# Patient Record
Sex: FEMALE | URBAN - METROPOLITAN AREA
[De-identification: names, ages, dates, MRNs, and addresses within clinical notes are randomized per-mention and may not be internally consistent; named-entity substitution may affect disease eponyms.]

---

## 2023-03-20 ENCOUNTER — APPOINTMENT (RX ONLY)
Dept: URBAN - METROPOLITAN AREA CLINIC 114 | Facility: CLINIC | Age: 73
Setting detail: DERMATOLOGY
End: 2023-03-20

## 2023-03-20 DIAGNOSIS — L72.8 OTHER FOLLICULAR CYSTS OF THE SKIN AND SUBCUTANEOUS TISSUE: ICD-10-CM

## 2023-03-20 DIAGNOSIS — N76.4 ABSCESS OF VULVA: ICD-10-CM

## 2023-03-20 PROCEDURE — ? INTRALESIONAL KENALOG

## 2023-03-20 PROCEDURE — 99204 OFFICE O/P NEW MOD 45 MIN: CPT | Mod: 25

## 2023-03-20 PROCEDURE — ? PRESCRIPTION

## 2023-03-20 PROCEDURE — ? COUNSELING

## 2023-03-20 PROCEDURE — 11900 INJECT SKIN LESIONS </W 7: CPT

## 2023-03-20 PROCEDURE — ? PRESCRIPTION MEDICATION MANAGEMENT

## 2023-03-20 RX ORDER — DOXYCYCLINE HYCLATE 100 MG/1
TABLET, COATED ORAL
Qty: 28 | Refills: 0 | Status: ERX | COMMUNITY
Start: 2023-03-20

## 2023-03-20 RX ORDER — CLINDAMYCIN PHOSPHATE 10 MG/G
GEL TOPICAL
Qty: 60 | Refills: 0 | Status: ERX | COMMUNITY
Start: 2023-03-20

## 2023-03-20 RX ADMIN — CLINDAMYCIN PHOSPHATE: 10 GEL TOPICAL at 00:00

## 2023-03-20 RX ADMIN — DOXYCYCLINE HYCLATE: 100 TABLET, COATED ORAL at 00:00

## 2023-03-20 ASSESSMENT — LOCATION SIMPLE DESCRIPTION DERM: LOCATION SIMPLE: GROIN

## 2023-03-20 ASSESSMENT — LOCATION DETAILED DESCRIPTION DERM
LOCATION DETAILED: LEFT INGUINAL CREASE
LOCATION DETAILED: MONS PUBIS

## 2023-03-20 ASSESSMENT — LOCATION ZONE DERM
LOCATION ZONE: TRUNK
LOCATION ZONE: VULVA

## 2023-03-20 NOTE — PROCEDURE: PRESCRIPTION MEDICATION MANAGEMENT
Detail Level: Zone
Initiate Treatment: doxycycline hyclate 100 mg tablet \\nQuantity: 28.0 Tablet\\nSig: Take one PO BID x 2 weeks\\n\\nclindamycin 1 % topical gel \\nQuantity: 60.0 g  Days Supply: 30\\nSig: Apply a thin layer to the affected area BID
Render In Strict Bullet Format?: No

## 2023-03-30 ENCOUNTER — APPOINTMENT (RX ONLY)
Dept: URBAN - METROPOLITAN AREA CLINIC 114 | Facility: CLINIC | Age: 73
Setting detail: DERMATOLOGY
End: 2023-03-30

## 2023-03-30 DIAGNOSIS — N76.4 ABSCESS OF VULVA: ICD-10-CM | Status: RESOLVING

## 2023-03-30 DIAGNOSIS — L72.8 OTHER FOLLICULAR CYSTS OF THE SKIN AND SUBCUTANEOUS TISSUE: ICD-10-CM

## 2023-03-30 DIAGNOSIS — B37.2 CANDIDIASIS OF SKIN AND NAIL: ICD-10-CM

## 2023-03-30 PROCEDURE — ? COUNSELING

## 2023-03-30 PROCEDURE — ? PRESCRIPTION MEDICATION MANAGEMENT

## 2023-03-30 PROCEDURE — ? PRESCRIPTION

## 2023-03-30 PROCEDURE — 99214 OFFICE O/P EST MOD 30 MIN: CPT

## 2023-03-30 RX ORDER — FLUCONAZOLE 150 MG/1
TABLET ORAL
Qty: 2 | Refills: 0 | Status: ERX | COMMUNITY
Start: 2023-03-30

## 2023-03-30 RX ADMIN — FLUCONAZOLE: 150 TABLET ORAL at 00:00

## 2023-03-30 ASSESSMENT — LOCATION ZONE DERM
LOCATION ZONE: VAGINA
LOCATION ZONE: TRUNK

## 2023-03-30 ASSESSMENT — LOCATION DETAILED DESCRIPTION DERM
LOCATION DETAILED: LEFT INGUINAL CREASE
LOCATION DETAILED: VESTIBULE

## 2023-03-30 ASSESSMENT — LOCATION SIMPLE DESCRIPTION DERM
LOCATION SIMPLE: GROIN
LOCATION SIMPLE: VESTIBULE

## 2023-03-30 NOTE — PROCEDURE: PRESCRIPTION MEDICATION MANAGEMENT
Detail Level: Zone
Initiate Treatment: doxycycline hyclate 100 mg tablet \\nQuantity: 28.0 Tablet\\nSig: Take one PO BID x 2 weeks\\n\\nclindamycin 1 % topical gel \\nQuantity: 60.0 g  Days Supply: 30\\nSig: Apply a thin layer to the affected area BID
Render In Strict Bullet Format?: No
Initiate Treatment: Diflucan 150 mg tablet \\nQuantity: 2.0 Tablet  Days Supply: 14\\nSig: Take 1 pill today, then 1 pill in one week

## 2023-05-01 ENCOUNTER — APPOINTMENT (RX ONLY)
Dept: URBAN - METROPOLITAN AREA CLINIC 114 | Facility: CLINIC | Age: 73
Setting detail: DERMATOLOGY
End: 2023-05-01

## 2023-05-01 DIAGNOSIS — N76.4 ABSCESS OF VULVA: ICD-10-CM | Status: IMPROVED

## 2023-05-01 DIAGNOSIS — L57.8 OTHER SKIN CHANGES DUE TO CHRONIC EXPOSURE TO NONIONIZING RADIATION: ICD-10-CM

## 2023-05-01 DIAGNOSIS — L72.8 OTHER FOLLICULAR CYSTS OF THE SKIN AND SUBCUTANEOUS TISSUE: ICD-10-CM | Status: RESOLVED

## 2023-05-01 DIAGNOSIS — L57.0 ACTINIC KERATOSIS: ICD-10-CM

## 2023-05-01 DIAGNOSIS — B37.2 CANDIDIASIS OF SKIN AND NAIL: ICD-10-CM | Status: RESOLVED

## 2023-05-01 PROCEDURE — ? PRESCRIPTION MEDICATION MANAGEMENT

## 2023-05-01 PROCEDURE — 17000 DESTRUCT PREMALG LESION: CPT

## 2023-05-01 PROCEDURE — 99214 OFFICE O/P EST MOD 30 MIN: CPT | Mod: 25

## 2023-05-01 PROCEDURE — ? COUNSELING

## 2023-05-01 PROCEDURE — ? LIQUID NITROGEN

## 2023-05-01 ASSESSMENT — LOCATION SIMPLE DESCRIPTION DERM
LOCATION SIMPLE: VESTIBULE
LOCATION SIMPLE: GROIN
LOCATION SIMPLE: LEFT CHEEK
LOCATION SIMPLE: LEFT LIP

## 2023-05-01 ASSESSMENT — LOCATION ZONE DERM
LOCATION ZONE: VAGINA
LOCATION ZONE: TRUNK
LOCATION ZONE: LIP
LOCATION ZONE: FACE

## 2023-05-01 ASSESSMENT — LOCATION DETAILED DESCRIPTION DERM
LOCATION DETAILED: LEFT UPPER CUTANEOUS LIP
LOCATION DETAILED: VESTIBULE
LOCATION DETAILED: LEFT INGUINAL CREASE
LOCATION DETAILED: LEFT CENTRAL MALAR CHEEK

## 2023-05-01 NOTE — PROCEDURE: PRESCRIPTION MEDICATION MANAGEMENT
Detail Level: Zone
Continue Regimen: AS NEEDED if inflammation not resolved doxycycline hyclate 100 mg tablet \\nQuantity: 28.0 Tablet\\nSig: Take one PO BID x 2 weeks\\n\\nclindamycin 1 % topical gel \\nQuantity: 60.0 g  Days Supply: 30\\nSig: Apply a thin layer to the affected area BID
Render In Strict Bullet Format?: No
Discontinue Regimen: Diflucan 150 mg tablet \\nQuantity: 2.0 Tablet  Days Supply: 14\\nSig: Take 1 pill today, then 1 pill in one week

## 2023-05-01 NOTE — PROCEDURE: COUNSELING
Detail Level: Detailed
Patient Specific Counseling (Will Not Stick From Patient To Patient): .\\nPt to come in for fbse on one month